# Patient Record
Sex: FEMALE | Race: WHITE | Employment: FULL TIME | URBAN - METROPOLITAN AREA
[De-identification: names, ages, dates, MRNs, and addresses within clinical notes are randomized per-mention and may not be internally consistent; named-entity substitution may affect disease eponyms.]

---

## 2022-09-27 ENCOUNTER — APPOINTMENT (OUTPATIENT)
Dept: GENERAL RADIOLOGY | Age: 52
End: 2022-09-27
Payer: COMMERCIAL

## 2022-09-27 ENCOUNTER — HOSPITAL ENCOUNTER (EMERGENCY)
Age: 52
Discharge: HOME | End: 2022-09-27
Payer: COMMERCIAL

## 2022-09-27 VITALS
RESPIRATION RATE: 16 BRPM | TEMPERATURE: 98 F | HEART RATE: 77 BPM | BODY MASS INDEX: 37.13 KG/M2 | OXYGEN SATURATION: 96 % | SYSTOLIC BLOOD PRESSURE: 116 MMHG | DIASTOLIC BLOOD PRESSURE: 72 MMHG | WEIGHT: 245 LBS | HEIGHT: 68 IN

## 2022-09-27 DIAGNOSIS — S90.30XA CONTUSION OF DORSUM OF FOOT: Primary | ICD-10-CM

## 2022-09-27 PROCEDURE — 73630 X-RAY EXAM OF FOOT: CPT

## 2022-09-27 PROCEDURE — 99283 EMERGENCY DEPT VISIT LOW MDM: CPT

## 2022-09-27 ASSESSMENT — PAIN SCALES - GENERAL
PAINLEVEL_OUTOF10: 1
PAINLEVEL_OUTOF10: 7

## 2022-09-27 ASSESSMENT — PAIN - FUNCTIONAL ASSESSMENT
PAIN_FUNCTIONAL_ASSESSMENT: 0-10
PAIN_FUNCTIONAL_ASSESSMENT: 0-10

## 2022-09-27 ASSESSMENT — PAIN DESCRIPTION - ORIENTATION: ORIENTATION: RIGHT;LEFT

## 2022-09-27 ASSESSMENT — PAIN DESCRIPTION - LOCATION: LOCATION: FOOT

## 2022-09-28 NOTE — ED NOTES
Pt unable to bear weight with crutches/d/t Rotator Cuff injury to left arm. Pt states \"they hurt my arm I don't trust myself with them\". Phoebe Weston NP made aware.      Tani Paz LPN  69/14/44 [id number]

## 2022-09-28 NOTE — ED NOTES
Pt instructed to follow up with PCP. Assessed per Roxana Gomez NP.      Gael Oquendo, MARY LOU  66/42/92 [id number]

## 2022-09-29 NOTE — ED PROVIDER NOTES
[address] [place] [address]  ED  EMERGENCY DEPARTMENT ENCOUNTER      This patient was not seen and evaluated by the attending physician. Pt Name: Lisandra Douglas  MRN: [id number]  [place] 1970  Date of evaluation: 9/27/2022  Provider: ARMEN LouiseC  PCP: Referring Not In System (Inactive)      History provided by the patient. CHIEFCOMPLAINT:     Chief Complaint   Patient presents with    Foot Injury     Both feet; dropped lvey on feet and both are hurting/bruised       HISTORY OF PRESENT ILLNESS:      Lisandra Douglas is a 50 y.o. female who presents to [address] [place] [address]  ED with complaints of injury to both feet, patient states that she dropped some levy on both of her feet, left worse than right. Complaining of pain along the dorsum. Patient does have an abrasion to the dorsum of the left foot, otherwise no other injuries or complaints. Here for further evaluation      LOCATION:feet  QUALITY:ache  SEVERITY:8  DURATION:today  MODIFYING FACTORS:none noted    Nursing Notes were reviewed     REVIEW OF SYSTEMS:     Review of Systems  All systems, a total of 10, are reviewed and negative except for those that were just noted in history present illness. PAST MEDICAL HISTORY:   History reviewed. No pertinent past medical history. SURGICAL HISTORY:      Past Surgical History:   Procedure Laterality Date    WISDOM TOOTH EXTRACTION           CURRENT MEDICATIONS:     There are no discharge medications for this patient. ALLERGIES:    Patient has no known allergies. FAMILY HISTORY:     History reviewed. No pertinent family history.        SOCIAL HISTORY:     Social History     Socioeconomic History    Marital status:      Spouse name: None    Number of children: None    Years of education: None    Highest education level: None   Tobacco Use    Smoking status: Never     Passive exposure: Never    Smokeless tobacco: Never   Vaping Use    Vaping Use: Never used   Substance and Sexual Activity    Alcohol use: Not Currently    Drug use: Not Currently    Sexual activity: Yes       SCREENINGS:    Rogers Coma Scale  Eye Opening: Spontaneous  Best Verbal Response: Oriented  Best Motor Response: Obeys commands  Rogers Coma Scale Score: 15        PHYSICAL EXAM:       ED Triage Vitals [09/27/22 2158]   BP Temp Temp src Heart Rate Resp SpO2 Height Weight   (!) 150/90 98 °F (36.7 °C) -- 81 15 98 % 5' 8\" (1.727 m) 245 lb (111.1 kg)       Physical Exam    CONSTITUTIONAL: Awake and alert. Cooperative. Well-developed. Well-nourished. Vitals:    09/27/22 2158 09/27/22 2320   BP: (!) 150/90 116/72   Pulse: 81 77   Resp: 15 16   Temp: 98 °F (36.7 °C)    SpO2: 98% 96%   Weight: 245 lb (111.1 kg)    Height: 5' 8\" (1.727 m)      HENT: Normocephalic. Atraumatic. External ears normal, without discharge. Nonasal discharge. Mucous membranes moist.  EYES: Conjunctiva non-injected, no lid abnormalities noted. No scleral icterus. EOM's grossly intact. Anterior chambers clear. NECK: Supple. Normal ROM. No meningismus. CARDIOVASCULAR: no tachycardia per vital signs. PULMONARY/CHEST WALL: Effort normal. No tachypnea. No audible adventitious breath sounds. ABDOMEN: No obvious abdominal distention, no obvious hernias. Back: Spine is midline. No obvious trauma or outward signs of cauda equina  /ANORECTAL: Not assessed  MUSKULOSKELETAL: Normal ROM. No acute deformities. No edema. There is some tenderness on the dorsum of the feet bilaterally. 2+ dorsalis pedis and posterior tib pulses present  SKIN: Warm and dry. Abrasion noted to the dorsum of the left foot. NEUROLOGICAL:  GCS 15. No obvious focal neurological deficits. PSYCHIATRIC: Normal affect, normal insight and judgement. Alert and oriented x 3. DIAGNOSTIC RESULTS:     LABS:    No results found for this visit on 09/27/22. RADIOLOGY:  All x-ray studies are viewed/reviewed by me.   Formal interpretations per the radiologist are as follows:      XR FOOT RIGHT (MIN 3 VIEWS)   Final Result   Mild osteoarthritic changes throughout the digits which is most prominent   along the 1st MTP joint with no acute bony abnormality      Mild soft tissue swelling lateral to the 1st MTP joint. XR FOOT LEFT (MIN 3 VIEWS)   Final Result   Mild dorsal soft tissue swelling. No acute bony abnormality detected. EKG:  See EKG interpretation by an attending physician. PROCEDURES:   N/A    CRITICAL CARE TIME:   N/A    CONSULTS:  None      EMERGENCY DEPARTMENT COURSE andDIFFERENTIAL DIAGNOSIS/MDM:   Vitals:    Vitals:    09/27/22 2158 09/27/22 2320   BP: (!) 150/90 116/72   Pulse: 81 77   Resp: 15 16   Temp: 98 °F (36.7 °C)    SpO2: 98% 96%   Weight: 245 lb (111.1 kg)    Height: 5' 8\" (1.727 m)        Patient wasgiven the following medications:  Medications - No data to display      Patient was evaluated independently by myself with the attending physician available for consultation. Patient presented to the emergency room today with complaints of feet pain after dropping a piece of levy on both her feet. There is an abrasion noted to the dorsum of the left foot that did not require any repair. Radiographic imaging showed no evidence of acute fracture, she had no neurologic deficits, she was discharged home good condition, strict and return to the ED for emergent worsening symptoms    Patient laboratory studies, radiographic imaging, and assessment were all discussed with the patient and/orpatient family. There was shared decision-making between myself as well as the patient and/or their surrogate and we are all in agreement with discharge home. There was an opportunity for questions and all questions were answered tothe best of my ability and to the satisfaction of the patient and/or patient family. FINAL IMPRESSION:      1.  Contusion of dorsum of foot          DISPOSITION/PLAN:   DISPOSITION Decision To Discharge      PATIENT REFERRED TO:  [facility]  ED  [address]  Go to   If symptoms worsen      DISCHARGE MEDICATIONS:  There are no discharge medications for this patient.                  (Please note thatportions of this note were completed with a voice recognition program.  Efforts were made to edit the dictations, but occasionally words are mis-transcribed.)    ARMEN Rust - CNP-C (electronicallysigned)       ARMEN Rust CNP  09/29/22 0102